# Patient Record
Sex: FEMALE | Race: OTHER | Employment: UNEMPLOYED | URBAN - METROPOLITAN AREA
[De-identification: names, ages, dates, MRNs, and addresses within clinical notes are randomized per-mention and may not be internally consistent; named-entity substitution may affect disease eponyms.]

---

## 2017-11-04 ENCOUNTER — HOSPITAL ENCOUNTER (EMERGENCY)
Age: 67
Discharge: HOME | End: 2017-11-04
Attending: EMERGENCY MEDICINE | Admitting: EMERGENCY MEDICINE
Payer: SELF-PAY

## 2017-11-04 VITALS
RESPIRATION RATE: 14 BRPM | SYSTOLIC BLOOD PRESSURE: 110 MMHG | DIASTOLIC BLOOD PRESSURE: 67 MMHG | HEART RATE: 75 BPM | TEMPERATURE: 97.7 F | WEIGHT: 100 LBS | OXYGEN SATURATION: 99 %

## 2017-11-04 DIAGNOSIS — L97.921 LEG ULCER, LEFT, LIMITED TO BREAKDOWN OF SKIN (HCC): Primary | ICD-10-CM

## 2017-11-04 DIAGNOSIS — L95.9 VASCULITIS LIMITED TO SKIN: ICD-10-CM

## 2017-11-04 DIAGNOSIS — L03.116 CELLULITIS OF LEFT LOWER EXTREMITY: ICD-10-CM

## 2017-11-04 LAB
ANION GAP SERPL CALC-SCNC: 9 MMOL/L (ref 3–18)
BASOPHILS # BLD: 0 K/UL (ref 0–0.06)
BASOPHILS NFR BLD: 0 % (ref 0–2)
BUN SERPL-MCNC: 12 MG/DL (ref 7–18)
BUN/CREAT SERPL: 17 (ref 12–20)
CALCIUM SERPL-MCNC: 9.3 MG/DL (ref 8.5–10.1)
CHLORIDE SERPL-SCNC: 107 MMOL/L (ref 100–108)
CO2 SERPL-SCNC: 27 MMOL/L (ref 21–32)
CREAT SERPL-MCNC: 0.71 MG/DL (ref 0.6–1.3)
CRP SERPL-MCNC: <0.3 MG/DL (ref 0–0.3)
DIFFERENTIAL METHOD BLD: NORMAL
EOSINOPHIL # BLD: 0.3 K/UL (ref 0–0.4)
EOSINOPHIL NFR BLD: 3 % (ref 0–5)
ERYTHROCYTE [DISTWIDTH] IN BLOOD BY AUTOMATED COUNT: 13.7 % (ref 11.6–14.5)
GLUCOSE SERPL-MCNC: 112 MG/DL (ref 74–99)
HCT VFR BLD AUTO: 40.2 % (ref 35–45)
HGB BLD-MCNC: 13.8 G/DL (ref 12–16)
LYMPHOCYTES # BLD: 3.1 K/UL (ref 0.9–3.6)
LYMPHOCYTES NFR BLD: 38 % (ref 21–52)
MCH RBC QN AUTO: 29.2 PG (ref 24–34)
MCHC RBC AUTO-ENTMCNC: 34.3 G/DL (ref 31–37)
MCV RBC AUTO: 85 FL (ref 74–97)
MONOCYTES # BLD: 0.5 K/UL (ref 0.05–1.2)
MONOCYTES NFR BLD: 6 % (ref 3–10)
NEUTS SEG # BLD: 4.3 K/UL (ref 1.8–8)
NEUTS SEG NFR BLD: 53 % (ref 40–73)
PLATELET # BLD AUTO: 221 K/UL (ref 135–420)
PMV BLD AUTO: 10.6 FL (ref 9.2–11.8)
POTASSIUM SERPL-SCNC: 3.9 MMOL/L (ref 3.5–5.5)
RBC # BLD AUTO: 4.73 M/UL (ref 4.2–5.3)
SODIUM SERPL-SCNC: 143 MMOL/L (ref 136–145)
WBC # BLD AUTO: 8.1 K/UL (ref 4.6–13.2)

## 2017-11-04 PROCEDURE — 80048 BASIC METABOLIC PNL TOTAL CA: CPT | Performed by: EMERGENCY MEDICINE

## 2017-11-04 PROCEDURE — 74011250637 HC RX REV CODE- 250/637: Performed by: EMERGENCY MEDICINE

## 2017-11-04 PROCEDURE — 87186 SC STD MICRODIL/AGAR DIL: CPT | Performed by: EMERGENCY MEDICINE

## 2017-11-04 PROCEDURE — 87070 CULTURE OTHR SPECIMN AEROBIC: CPT | Performed by: EMERGENCY MEDICINE

## 2017-11-04 PROCEDURE — 86140 C-REACTIVE PROTEIN: CPT | Performed by: EMERGENCY MEDICINE

## 2017-11-04 PROCEDURE — 99283 EMERGENCY DEPT VISIT LOW MDM: CPT

## 2017-11-04 PROCEDURE — 85025 COMPLETE CBC W/AUTO DIFF WBC: CPT | Performed by: EMERGENCY MEDICINE

## 2017-11-04 PROCEDURE — 87077 CULTURE AEROBIC IDENTIFY: CPT | Performed by: EMERGENCY MEDICINE

## 2017-11-04 RX ORDER — OXYCODONE AND ACETAMINOPHEN 5; 325 MG/1; MG/1
1 TABLET ORAL
Status: COMPLETED | OUTPATIENT
Start: 2017-11-04 | End: 2017-11-04

## 2017-11-04 RX ORDER — CIPROFLOXACIN 500 MG/1
500 TABLET ORAL 2 TIMES DAILY
Qty: 14 TAB | Refills: 0 | Status: SHIPPED | OUTPATIENT
Start: 2017-11-04 | End: 2017-11-11

## 2017-11-04 RX ORDER — SULFAMETHOXAZOLE AND TRIMETHOPRIM 800; 160 MG/1; MG/1
2 TABLET ORAL
Status: COMPLETED | OUTPATIENT
Start: 2017-11-04 | End: 2017-11-04

## 2017-11-04 RX ORDER — LEVOFLOXACIN 500 MG/1
500 TABLET, FILM COATED ORAL
Status: COMPLETED | OUTPATIENT
Start: 2017-11-04 | End: 2017-11-04

## 2017-11-04 RX ORDER — SULFAMETHOXAZOLE AND TRIMETHOPRIM 800; 160 MG/1; MG/1
1 TABLET ORAL 2 TIMES DAILY
Qty: 14 TAB | Refills: 0 | Status: SHIPPED | OUTPATIENT
Start: 2017-11-04 | End: 2017-11-11

## 2017-11-04 RX ORDER — OXYCODONE AND ACETAMINOPHEN 5; 325 MG/1; MG/1
TABLET ORAL
Qty: 20 TAB | Refills: 0 | Status: SHIPPED | OUTPATIENT
Start: 2017-11-04

## 2017-11-04 RX ADMIN — SULFAMETHOXAZOLE AND TRIMETHOPRIM 2 TABLET: 800; 160 TABLET ORAL at 03:29

## 2017-11-04 RX ADMIN — OXYCODONE HYDROCHLORIDE AND ACETAMINOPHEN 1 TABLET: 5; 325 TABLET ORAL at 03:28

## 2017-11-04 RX ADMIN — LEVOFLOXACIN 500 MG: 500 TABLET, FILM COATED ORAL at 03:29

## 2017-11-04 NOTE — DISCHARGE INSTRUCTIONS
Aprenda acerca de la vasculitis - [ Learning About Vasculitis ]  ¿Qué es la vasculitis? La vasculitis es marely inflamación de los vasos sanguíneos. [place] sucede cuando el propio sistema inmunitario del cuerpo ataca los vasos sanguíneos. Donovan sistema inmunitario podría estar reaccionando a marely infección o a un medicamento. O chyna vez usted tenga un trastorno inmunitario. La vasculitis hace que las white de los vasos sanguíneos aumenten de grosor, se debiliten o se estiren. [place] dificulta la circulación de la rod. Y puede conducir a síntomas en cualquier parte de donovan cuerpo que no esté recibiendo eSilicon. Hay diferentes tipos de vasculitis. Y pueden verse afectadas diferentes partes del cuerpo. [place] incluye la [place] articulaciones y los músculos, la piel y algunos órganos internos. ¿Qué puede esperar cuando tiene vasculitis? La vasculitis puede causar marely amplia variedad de síntomas. Qué síntomas tiene dependerá de qué vasos sanguíneos estén comprometidos y la gravedad del problema. Algunos síntomas comunes son:  · Ross Fabiola. · Bajar de peso y no sentir hambre. · Cansancio general.  · Amelia y [place] IDx. · Dolor e Luis [place] en un brazo, marely pierna o alguna otra parte del cuerpo donde están inflamados los vasos sanguíneos. Para algunas personas, el problema es de corta duración. Fabi Fung, es a andie plazo, o crónico.  Annalisa problema también puede desaparecer, solo para regresar Floydene Mckeon. ¿Cómo se trata? La meta principal del tratamiento es reducir la inflamación en los vasos sanguíneos. Los casos leves pueden desaparecer por sí solos. A veces, los analgésicos (medicamentos para el dolor) de venta ari ayudan. Para casos más cande, un médico podría recetar un medicamento más potente, cruz un corticosteroide. La atención de seguimiento es marely parte clave de donovan tratamiento y seguridad.  Asegúrese de hacer y acudir a todas las citas, y llame a donovan médico si está teniendo problemas. También es marely buena idea saber los resultados de los exámenes y mantener marely lista de los medicamentos que artemio. ¿Dónde puede encontrar más información en inglés? S[place]ero [place] a http://sherrill-ursula.info/. Escriba P353 en la búsqueda para aprender más acerca de \"Aprenda acerca de la vasculitis - [ Learning About Vasculitis ]. \"  Revisado: 20 [address] [place] 2017  Versión del contenido: 11.4  © 4355-5316 Healthwise, Incorporated. Las instrucciones de cuidado fueron adaptadas bajo licencia por [employer] (which disclaims liability or warranty for this information). Si usted tiene [place] afección médica o sobre estas instrucciones, siempre pregunte a garcia profesional de tolu. Healthwise, Incorporated niega toda garantía o responsabilidad por garcia uso de esta información. Celulitis: Instrucciones de cuidado - [ Cellulitis: Care Instructions ]  Instrucciones de cuidado    La celulitis es marely infección cutánea. Suele producirse tras marely ruptura en la piel por marely raspadura, jeimy, mordedura o punción, o tras un salpullido. El médico lo bills examinado minuciosamente, cynthia pueden presentarse problemas más tarde. Si nota algún problema o nuevos síntomas, busque tratamiento médico de inmediato. La atención de seguimiento es marely parte clave de garcia tratamiento y seguridad. Asegúrese de hacer y acudir a todas las citas, y llame a garcia médico si está teniendo problemas. También es marely buena idea saber los resultados de los exámenes y mantener marely lista de los medicamentos que artemio. ¿Cómo puede cuidarse en el hogar? · [place] óscar antibióticos de la manera indicada. No deje de tomarlos solo porque se sienta mejor. Debe carlton todos los antibióticos hasta terminarlos. · Eleve la waqar infectada sobre marely almohada para reducir el dolor y la hinchazón. Trate de mantener la waqar por encima del nivel del corazón tan a menudo cruz sea posible.   · Si garcia médico le dijo cómo cuidarse la herida, siga las instrucciones de garcia médico. Si no le deo instrucciones, siga estos consejos generales:  ¨ Lávese la herida con agua limpia 2 veces al día. No use peróxido de hidrógeno (agua [place]) ni alcohol, los cuales pueden retrasar la sanación. ¨ Puede cubrirse la herida con marely capa delgada de vaselina y marely venda no adherente. ¨ Aplíquese más vaselina y reemplace la venda según sea necesario. · Sea ronaldo con los medicamentos. [place] los analgésicos (medicamentos para el dolor) exactamente cruz le fueron indicados. ¨ Si el médico le deo un analgésico recetado, [employer] se lo indicó. ¨ Si usted no está tomando un analgésico recetado, pregúntele a garcia médico si puede carlton un medicamento de [facility]. Para prevenir la celulitis en el futuro  · Trate de evitar los atwood, los rasguños u otras lesiones en la piel. La celulitis suele ocurrir con mayor frecuencia donde haya marely ruptura de la piel. · Si tiene [place], jeimy, [place] leve o mordedura, lávese la herida con agua limpia lo antes que pueda para ayudar a evitar marely infección. No use peróxido de hidrógeno (agua [place]) ni alcohol, los cuales pueden retrasar la sanación. · Si tiene hinchazón en las piernas (edema), el uso de medias de soporte y un buen cuidado de la piel pueden ayudarle a prevenir llagas en la piel y celulitis. · Cuide de óscar pies, sobre todo si tiene diabetes u otras afecciones que aumenten el riesgo de [place]. Use zapatos y calcetines. No camine descalzo. Si tiene pie de atleta u otros problemas cutáneos en los pies, hable con garcia médico de cómo tratarlos. ¿Cuándo debe pedir ayuda? Llame a garcia médico ahora mismo o busque atención médica inmediata si:  ? · Tiene señales de que garcia infección está empeorando, tales cruz:  ¨ Aumento de dolor, hinchazón, temperatura o enrojecimiento. ¨ Vetas rojizas que salen de la waqar. ¨ Pus que sale de la waqar. Argie Cleaves. ? · Tiene un salpullido. ? Vigile de cerca los [place] y asegúrese de comunicarse con garcia médico si:  ? · No está mejorando después de 1 día (24 horas). ? · No mejora cruz se esperaba. ¿Dónde puede encontrar más información en inglés? Donold Noss a http://sherrill-ursula.info/. Carla [place] en la búsqueda para aprender más acerca de \"Celulitis: Instrucciones de cuidado - [ Cellulitis: Care Instructions ]. \"  Revisado: 13 octubre, 2016  Versión del contenido: 11.4  © 5571-3887 Healthwise, Incorporated. Las instrucciones de cuidado fueron adaptadas bajo licencia por [employer] (which disclaims liability or warranty for this information). Si usted tiene [place] afección médica o sobre estas instrucciones, siempre pregunte a garcia profesional de tolu. Healthwise, Incorporated niega toda garantía o responsabilidad por garcia uso de esta información.

## 2017-11-04 NOTE — ED PROVIDER NOTES
[address]  EMERGENCY DEPARTMENT HISTORY AND PHYSICAL EXAM       Date: 11/4/2017   Patient Name: Danna Vegas   YOB: 1950  Medical Record Number: [id number]    History of Presenting Illness     Chief Complaint   Patient presents with    Foot Problem        History Provided By:  Patient and family member ()    Additional History:   2:39 AM    Danna Vegas is a 79 y.o. female with PMHx of HTN presenting by wheelchair to the ED c/o bilateral leg pain, onset 4 months ago. Associated symptoms include right foot swelling with skin erosions on the dorsal of both feet and ankles, redness, and decreased sleep secondary to pain. Pt just arrived from [facility] today. At [facility], pt had blood sugar checked 125 and was instructed to be checked following day but could not due to lack of insurance. Blood pressure also checked and found to be in the 200's. Pt states that she was given pills at [facility] but was unsure of their purpose. Last dose of medicine was 3 days ago. NKDA. Notes family hx of DM (maternal). Pt denies any history of DM. Pt specifically denies any fever, chills, nausea, vomiting, CP, or any other sxs or complaints at this time. Primary Care Provider: None   Specialist:    Past History     Past Medical History:   Past Medical History:   Diagnosis Date    Hypertension         Past Surgical History:   History reviewed. No pertinent surgical history. Family History:   History reviewed. No pertinent family history. Social History:   Social History   Substance Use Topics    Smoking status: None    Smokeless tobacco: None    Alcohol use None        Allergies:   No Known Allergies     Review of Systems   Review of Systems   Constitutional: Negative for chills, diaphoresis, fever and unexpected weight change. HENT: Negative for congestion, drooling, ear pain, rhinorrhea, sore throat, tinnitus and trouble swallowing. Eyes: Negative for photophobia, pain, redness and visual disturbance. Respiratory: Negative for cough, choking, chest tightness, shortness of breath, wheezing and stridor. Cardiovascular: Negative for chest pain, palpitations and leg swelling. Gastrointestinal: Negative for abdominal distention, abdominal pain, anal bleeding, blood in stool, constipation, diarrhea, nausea and vomiting. Genitourinary: Negative for difficulty urinating, dysuria, flank pain, frequency, hematuria and urgency. Musculoskeletal: Positive for arthralgias (BLE), joint swelling (right foot and ankle) and myalgias (BLE). Negative for back pain and neck pain. Skin: Negative for color change, rash and wound. (+) holes on the dorsal aspect of right foot   Neurological: Negative for dizziness, seizures, syncope, speech difficulty, light-headedness and headaches. Hematological: Does not bruise/bleed easily. Psychiatric/Behavioral: Positive for sleep disturbance (secondary to pain). Negative for agitation, behavioral problems, hallucinations, self-injury and suicidal ideas. The patient is not hyperactive. All other systems reviewed and are negative. Physical Exam  Vitals:    11/04/17 0207 11/04/17 0230   BP: 120/69 103/69   Pulse: 72 70   Resp: 14    Temp: 97.7 °F (36.5 °C)    SpO2: 98% 96%   Weight: 45.4 kg (100 lb)        Physical Exam   Constitutional: She is oriented to person, place, and time. She appears well-developed. Non-toxic appearance. No distress. Overweight. Comfortable appearing. HENT:   Head: Normocephalic and atraumatic. Right Ear: Hearing, tympanic membrane, external ear and ear canal normal.   Left Ear: Hearing, tympanic membrane, external ear and ear canal normal.   Nose: Nose normal.   Mouth/Throat: Oropharynx is clear and moist. No oropharyngeal exudate. Eyes: Conjunctivae and EOM are normal. Pupils are equal, round, and reactive to light. No scleral icterus.    No pallor   Neck: Normal range of motion. Neck supple. No JVD present. No tracheal deviation present. No thyromegaly present. Cardiovascular: Normal rate, regular rhythm and normal heart sounds. Exam reveals no gallop and no friction rub. No murmur heard. Cap refill less than 3 secs in all digits. Pulmonary/Chest: Effort normal and breath sounds normal. No stridor. No respiratory distress. She has no wheezes. She has no rales. Abdominal: Soft. Bowel sounds are normal. She exhibits no distension. There is no tenderness. There is no rebound and no guarding. Musculoskeletal: Normal range of motion. She exhibits no edema or tenderness. Right lower leg: She exhibits no edema. Left lower leg: She exhibits no edema. No soft tissue injuries   Lymphadenopathy:     She has no cervical adenopathy. Neurological: She is alert and oriented to person, place, and time. She has normal reflexes. No cranial nerve deficit. Coordination normal.   Skin: Skin is warm and dry. No rash noted. She is not diaphoretic. No erythema. Jaqueline Peppers vascular appearance to both legs with left leg having four skin erosions, dry, with trace amount of old blood and scar tissue. No exudate except for dried old material, no underlying fluctuace. No peripheral erythema. Psychiatric: She has a normal mood and affect. Her behavior is normal. Judgment and thought content normal.   Nursing note and vitals reviewed.        Diagnostic Study Results     Labs -      Recent Results (from the past 12 hour(s))   CBC WITH AUTOMATED DIFF    Collection Time: 11/04/17  3:04 AM   Result Value Ref Range    WBC 8.1 4.6 - 13.2 K/uL    RBC 4.73 4.20 - 5.30 M/uL    HGB 13.8 12.0 - 16.0 g/dL    HCT 40.2 35.0 - 45.0 %    MCV 85.0 74.0 - 97.0 FL    MCH 29.2 24.0 - 34.0 PG    MCHC 34.3 31.0 - 37.0 g/dL    RDW 13.7 11.6 - 14.5 %    PLATELET 521 096 - 085 K/uL    MPV 10.6 9.2 - 11.8 FL    NEUTROPHILS 53 40 - 73 %    LYMPHOCYTES 38 21 - 52 %    MONOCYTES 6 3 - 10 %    EOSINOPHILS 3 0 - 5 %    BASOPHILS 0 0 - 2 %    ABS. NEUTROPHILS 4.3 1.8 - 8.0 K/UL    ABS. LYMPHOCYTES 3.1 0.9 - 3.6 K/UL    ABS. MONOCYTES 0.5 0.05 - 1.2 K/UL    ABS. EOSINOPHILS 0.3 0.0 - 0.4 K/UL    ABS. BASOPHILS 0.0 0.0 - 0.06 K/UL    DF AUTOMATED     METABOLIC PANEL, BASIC    Collection Time: 11/04/17  3:04 AM   Result Value Ref Range    Sodium 143 136 - 145 mmol/L    Potassium 3.9 3.5 - 5.5 mmol/L    Chloride 107 100 - 108 mmol/L    CO2 27 21 - 32 mmol/L    Anion gap 9 3.0 - 18 mmol/L    Glucose 112 (H) 74 - 99 mg/dL    BUN 12 7.0 - 18 MG/DL    Creatinine 0.71 0.6 - 1.3 MG/DL    BUN/Creatinine ratio 17 12 - 20      GFR est AA >60 >60 ml/min/1.73m2    GFR est non-AA >60 >60 ml/min/1.73m2    Calcium 9.3 8.5 - 10.1 MG/DL       Radiologic Studies -   No orders to display        Medical Decision Making   I am the first provider for this patient. I reviewed the vital signs, available nursing notes, past medical history, past surgical history, family history and social history. DDx: Skin erosions appear to be from joint effusion or vasculitis, not from venous stasis or DM. Will tx for infection, cover for pseudomonas and MRSA, possibly even steroids and provide abx and pain control. Her pulses for DP and PT are all normal. Distal gangrene not an issue at this immediate time frame. Vital Signs-Reviewed the patient's vital signs. Patient Vitals for the past 12 hrs:   Temp Pulse Resp BP SpO2   11/04/17 0230 - 70 - 103/69 96 %   11/04/17 0207 97.7 °F (36.5 °C) 72 14 120/69 98 %       Pulse Oximetry Analysis - Normal 98% on RA. No intervention needed. Old Medical Records: Nursing notes. ED Course:     2:39 AM   Initial assessment performed. The patients presenting problems have been discussed, and they are in agreement with the care plan formulated and outlined with them. I have encouraged them to ask questions as they arise throughout their visit.     Medications Given in the ED:  Medications   levoFLOXacin [facility]) tablet 500 mg (500 mg Oral Given 11/4/17 0329)   trimethoprim-sulfamethoxazole (BACTRIM DS, SEPTRA DS) 160-800 mg per tablet 2 Tab (2 Tabs Oral Given 11/4/17 0329)   oxyCODONE-acetaminophen (PERCOCET) 5-325 mg per tablet 1 Tab (1 Tab Oral Given 11/4/17 0328)        Discharge Note:  3:28 AM  Patients results have been reviewed with them. Patient and/or family have verbally conveyed their understanding and agreement of the patient's signs, symptoms, diagnosis, treatment and prognosis and additionally agree to follow up as recommended or return to the Emergency Room should their condition change prior to their follow-up appointment. Patient verbally agrees with the care-plan and verbally conveys that all of their questions have been answered. Discharge instructions have also been provided to the patient with some educational information regarding their diagnosis as well a list of reasons why they would want to return to the ER prior to their follow-up appointment should their condition change. Diagnosis   Clinical Impression:   1. Leg ulcer, left, limited to breakdown of skin ([address]    2. 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 Cellulitis of left lower extremity         Follow-up Information     Follow up With Details Comments Contact Info Additional Information    Norbert Ag Call in 1 day for wound clinic follow up [address] [id number] [phone] Patients scheduled for OP Wound Care visits should enter the [facility], 2nd floor, [address] for check in.     THE [facility] EMERGENCY DEPT  As needed, If symptoms worsen [address] [id number]  [phone]     [id number] [place] [address] Schedule an appointment as soon as possible for a visit in 2 days for follow up at [facility] [address] [address], [address] [facility]  [phone]           Current Discharge Medication List      START taking these medications    Details   ciprofloxacin HCl (CIPRO) 500 mg tablet Take 1 Tab by mouth two (2) times a day for 7 days. Qty: 14 Tab, Refills: 0      trimethoprim-sulfamethoxazole (BACTRIM DS) 160-800 mg per tablet Take 1 Tab by mouth two (2) times a day for 7 days. Qty: 14 Tab, Refills: 0      oxyCODONE-acetaminophen (PERCOCET) 5-325 mg per tablet Take 1 tablet every 4-6 hours as needed for pain control. If you were instructed to try over the counter ibuprofen or tylenol, only take the percocet for pain not controlled with the over the counter medication. Qty: 20 Tab, Refills: 0             _______________________________   Attestations:     SCRIBE ATTESTATION:  This note is prepared by Cole Yee, acting as Scribe for Demetrio. Griselda Sood MD.    PROVIDER ATTESTATION:  Demetrio. Griselda Sood MD: The scribe's documentation has been prepared under my direction and personally reviewed by me in its entirety.  I confirm that the note above accurately reflects all work, treatment, procedures, and medical decision making performed by me.   _______________________________

## 2017-11-04 NOTE — ED TRIAGE NOTES
Pt just arrived from [facility] and has infection with holes in right foot and ankle anteriorly. Redness and mild swelling noted. Sepsis Screening completed    (  )Patient meets SIRS criteria. (x  )Patient does not meet SIRS criteria.       SIRS Criteria is achieved when two or more of the following are present   Temperature < 96.8°F (36°C) or > 100.9°F (38.3°C)   Heart Rate > 90 beats per minute   Respiratory Rate > 20 breaths per minute   WBC count > 12,000 or <4,000 or > 10% bands

## 2017-11-04 NOTE — ED NOTES
Max Goldman was discharged in good and improved condition. The patient's diagnosis, condition and treatment were explained to patient and written aftercare instructions were given. The patient verbalized good understanding. Patient armband removed and both labels and armband were placed in shred bin. Patient left ER via wheelchair in no acute distress. 1 prescription provided and 2 electronically sent to the pharmacy. Patient reports pain is currently 2 on numeric scale. Patient provided education about pain medication including dosage and side effects. Patient verbalized understanding. Ride (son) at bedside to provide transportation home.

## 2017-11-08 ENCOUNTER — HOSPITAL ENCOUNTER (OUTPATIENT)
Dept: WOUND CARE | Age: 67
Discharge: HOME | End: 2017-11-08
Payer: SELF-PAY

## 2017-11-08 LAB
BACTERIA SPEC CULT: ABNORMAL
GRAM STN SPEC: ABNORMAL
GRAM STN SPEC: ABNORMAL
SERVICE CMNT-IMP: ABNORMAL

## 2017-11-08 PROCEDURE — 99202 OFFICE O/P NEW SF 15 MIN: CPT

## 2017-11-08 PROCEDURE — 29580 STRAPPING UNNA BOOT: CPT

## 2017-11-15 PROCEDURE — 97602 WOUND(S) CARE NON-SELECTIVE: CPT

## 2017-11-22 PROCEDURE — 97602 WOUND(S) CARE NON-SELECTIVE: CPT

## 2017-11-29 PROCEDURE — 97602 WOUND(S) CARE NON-SELECTIVE: CPT

## 2017-12-06 ENCOUNTER — HOSPITAL ENCOUNTER (OUTPATIENT)
Dept: WOUND CARE | Age: 67
Discharge: HOME | End: 2017-12-06
Payer: SELF-PAY

## 2017-12-06 PROCEDURE — 97602 WOUND(S) CARE NON-SELECTIVE: CPT

## 2017-12-12 PROCEDURE — 97602 WOUND(S) CARE NON-SELECTIVE: CPT

## 2017-12-27 PROCEDURE — 97602 WOUND(S) CARE NON-SELECTIVE: CPT

## 2018-01-09 ENCOUNTER — HOSPITAL ENCOUNTER (OUTPATIENT)
Dept: WOUND CARE | Age: 68
Discharge: HOME | End: 2018-01-09
Payer: SELF-PAY

## 2018-01-09 PROCEDURE — 97602 WOUND(S) CARE NON-SELECTIVE: CPT

## 2018-01-17 PROCEDURE — 11042 DBRDMT SUBQ TIS 1ST 20SQCM/<: CPT

## 2018-02-01 ENCOUNTER — HOSPITAL ENCOUNTER (OUTPATIENT)
Dept: WOUND CARE | Age: 68
Discharge: HOME | End: 2018-02-01
Payer: SELF-PAY

## 2018-02-01 PROCEDURE — 97602 WOUND(S) CARE NON-SELECTIVE: CPT

## 2018-02-08 PROCEDURE — 97602 WOUND(S) CARE NON-SELECTIVE: CPT

## 2018-03-01 ENCOUNTER — HOSPITAL ENCOUNTER (OUTPATIENT)
Dept: WOUND CARE | Age: 68
Discharge: HOME | End: 2018-03-01
Payer: SELF-PAY

## 2018-03-01 PROCEDURE — 97602 WOUND(S) CARE NON-SELECTIVE: CPT

## 2018-03-14 PROCEDURE — 97602 WOUND(S) CARE NON-SELECTIVE: CPT

## 2018-03-22 ENCOUNTER — HOSPITAL ENCOUNTER (OUTPATIENT)
Dept: WOUND CARE | Age: 68
Discharge: HOME | End: 2018-03-22
Payer: SELF-PAY